# Patient Record
Sex: FEMALE | Race: AMERICAN INDIAN OR ALASKA NATIVE
[De-identification: names, ages, dates, MRNs, and addresses within clinical notes are randomized per-mention and may not be internally consistent; named-entity substitution may affect disease eponyms.]

---

## 2017-05-15 ENCOUNTER — HOSPITAL ENCOUNTER (EMERGENCY)
Dept: HOSPITAL 5 - ED | Age: 72
Discharge: HOME | End: 2017-05-15
Payer: MEDICARE

## 2017-05-15 VITALS — SYSTOLIC BLOOD PRESSURE: 125 MMHG | DIASTOLIC BLOOD PRESSURE: 78 MMHG

## 2017-05-15 DIAGNOSIS — S29.012A: Primary | ICD-10-CM
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
## 2017-05-15 DIAGNOSIS — F17.200: ICD-10-CM
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
## 2017-05-15 DIAGNOSIS — Z88.0: ICD-10-CM
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
## 2017-05-15 DIAGNOSIS — I10: ICD-10-CM

## 2017-05-15 DIAGNOSIS — J44.9: ICD-10-CM
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
## 2017-05-15 PROCEDURE — 72070 X-RAY EXAM THORAC SPINE 2VWS: CPT
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
## 2019-06-19 ENCOUNTER — HOSPITAL ENCOUNTER (EMERGENCY)
Dept: HOSPITAL 5 - ED | Age: 74
Discharge: HOME | End: 2019-06-19
Payer: MEDICARE

## 2019-06-19 DIAGNOSIS — M79.89: ICD-10-CM

## 2019-06-19 DIAGNOSIS — J44.9: ICD-10-CM

## 2019-06-19 DIAGNOSIS — I10: ICD-10-CM

## 2019-06-19 DIAGNOSIS — Z90.49: ICD-10-CM

## 2019-06-19 DIAGNOSIS — Z79.1: ICD-10-CM

## 2019-06-19 DIAGNOSIS — Z88.5: ICD-10-CM

## 2019-06-19 DIAGNOSIS — Z88.0: ICD-10-CM

## 2019-06-19 DIAGNOSIS — M10.071: Primary | ICD-10-CM

## 2019-06-19 DIAGNOSIS — Z98.890: ICD-10-CM

## 2019-06-19 PROCEDURE — 99283 EMERGENCY DEPT VISIT LOW MDM: CPT

## 2019-06-19 NOTE — EMERGENCY DEPARTMENT REPORT
ED General Adult HPI





- General


Chief complaint: Extremity Injury, Lower


Stated complaint: LT FOOT PAIN


Time Seen by Provider: 06/19/19 18:52


Source: patient


Mode of arrival: Wheelchair


Limitations: No Limitations





- History of Present Illness


Initial comments: 





73-year-old -American female with past medical history of osteoporosis 

and arthritis presents from urgent department complaining of atraumatic right 

foot pain.  Pain is to the mid foot was kind of radiates up to the right ankle, 

worse with ambulation and palpation.  She cannot recall an actual injury.  

Denies any fever, chills, bleeding, pustular discharge lacerations.  She reports

she may have had a gout flareup several years ago.  No nausea or vomiting, no 

chest pain, palpitations, no fever, chills, sweats. 


Location: lower extremity


Radiation: non-radiation, extremity


Quality: aching, dull


Improves with: none


Worsens with: none


Associated Symptoms: denies: confusion, chest pain, diaphoresis, fever/chills, 

loss of appetite, malaise, nausea/vomiting, rash, shortness of breath





- Related Data


                                  Previous Rx's











 Medication  Instructions  Recorded  Last Taken  Type


 


Cyclobenzaprine [Flexeril] 10 mg PO TID PRN #20 tablet 05/15/17 Unknown Rx


 


Ibuprofen [Motrin] 600 mg PO Q8H PRN #20 tablet 05/15/17 Unknown Rx


 


Indomethacin [Indocin] 25 mg PO Q8H #20 capsule 06/19/19 Unknown Rx











                                    Allergies











Allergy/AdvReac Type Severity Reaction Status Date / Time


 


morphine AdvReac  Dizziness Verified 06/19/19 18:33


 


Penicillins AdvReac  Swelling Verified 06/19/19 18:33














ED Review of Systems


ROS: 


Stated complaint: LT FOOT PAIN


Other details as noted in HPI





Constitutional: denies: chills, fever


Eyes: denies: eye pain, eye discharge, vision change


ENT: denies: ear pain, throat pain


Respiratory: denies: cough, shortness of breath, wheezing


Cardiovascular: denies: chest pain, palpitations


Endocrine: no symptoms reported


Gastrointestinal: denies: abdominal pain, nausea, diarrhea


Genitourinary: denies: urgency, dysuria, discharge


Musculoskeletal: joint swelling, arthralgia.  denies: back pain


Skin: denies: rash, lesions


Neurological: denies: headache, weakness, paresthesias


Psychiatric: denies: anxiety, depression


Hematological/Lymphatic: denies: easy bleeding, easy bruising





ED Past Medical Hx





- Past Medical History


Hx Hypertension: Yes


Hx COPD: Yes





- Surgical History


Hx Appendectomy: Yes


Additional Surgical History: hemorrhoidectomy, tonsil





- Social History


Smoking Status: Never Smoker


Substance Use Type: None





- Medications


Home Medications: 


                                Home Medications











 Medication  Instructions  Recorded  Confirmed  Last Taken  Type


 


Cyclobenzaprine [Flexeril] 10 mg PO TID PRN #20 tablet 05/15/17  Unknown Rx


 


Ibuprofen [Motrin] 600 mg PO Q8H PRN #20 tablet 05/15/17  Unknown Rx


 


Indomethacin [Indocin] 25 mg PO Q8H #20 capsule 06/19/19  Unknown Rx














ED Physical Exam





- General


Limitations: No Limitations


General appearance: alert, in no apparent distress





- Head


Head exam: Present: atraumatic, normocephalic





- Eye


Eye exam: Present: normal appearance, PERRL, EOMI


Pupils: Present: normal accommodation





- ENT


ENT exam: Present: normal exam, mucous membranes moist, TM's normal bilaterally





- Neck


Neck exam: Present: normal inspection, full ROM





- Respiratory


Respiratory exam: Present: normal lung sounds bilaterally.  Absent: respiratory 

distress, wheezes, rales, chest wall tenderness, accessory muscle use





- Cardiovascular


Cardiovascular Exam: Present: regular rate, normal rhythm.  Absent: systolic 

murmur, diastolic murmur, rubs, gallop





- GI/Abdominal


GI/Abdominal exam: Present: soft, normal bowel sounds.  Absent: tenderness, 

guarding, rebound





- Extremities Exam


Extremities exam: Present: normal inspection, full ROM, tenderness, normal 

capillary refill





- Expanded Lower Extremity Exam


  ** Right


Foot/Toe exam: Present: tenderness (there is tenderness along the area of the 

fourth metatarsals and up to the medial to lateral tarsals.), swelling.  Absent:

 laceration, ecchymosis, deformity, puncture wound, calcaneal tenderness, 

tenderness at base of 5th metatarsal, nail avulsion


Neuro vascular tendon exam: Present: no vascular compromise





- Back Exam


Back exam: Present: normal inspection, full ROM.  Absent: CVA tenderness (R), 

CVA tenderness (L), muscle spasm, paraspinal tenderness, vertebral tenderness





- Neurological Exam


Neurological exam: Present: alert, oriented X3, CN II-XII intact, normal gait





- Psychiatric


Psychiatric exam: Present: normal affect, normal mood.  Absent: anxious, flat 

affect, manic





- Skin


Skin exam: Present: warm, dry, intact, normal color.  Absent: rash, cyanosis, 

diaphoretic, erythema, petechiae, pallor, abrasion, ecchymosis





ED Course





                                   Vital Signs











  06/19/19





  18:53


 


Temperature 99.3 F


 


Pulse Rate 43 L


 


Respiratory 18





Rate 


 


Blood Pressure 122/58


 


O2 Sat by Pulse 97





Oximetry 














ED Medical Decision Making





- Radiology Data


Radiology results: report reviewed (x-ray of foot shows periosteal reaction to 

the fourth metatarsal shafts possibly a stress response)





- Medical Decision Making





73-year-old -American female process Mr. department spontaneous foot pain

 reports having had a history of a gout flareup of the pain and swelling is 

related to the ankle as well.  X-ray shows periosteal reaction.  No signs of an 

infectious process or CVA fracture.


May be secondary to prolonged arthritis/gout.  Arthritis/80 a healing/resolving 

stress fracture.  


Critical care attestation.: 


If time is entered above; I have spent that time in minutes in the direct care 

of this critically ill patient, excluding procedure time.








ED Disposition


Clinical Impression: 


 Foot swelling, Foot pain, right





Disposition: DC-01 TO HOME OR SELFCARE


Is pt being admited?: No


Does the pt Need Aspirin: No


Condition: Stable


Instructions:  Arthralgia (ED), Osteoarthritis (ED), Acute Gouty Arthritis (ED)


Referrals: 


PRIMARY CARE,MD [Primary Care Provider] - 3-5 Days


FELICITY CALLOWAY DPM [Staff Physician] - 3-5 Days


MICAH DYE MD [Staff Physician] - 3-5 Days

## 2019-06-19 NOTE — XRAY REPORT
PROCEDURE: XR ANKLE 3+V RT, XR FOOT 3+V RT 

 

TECHNIQUE:  Right ankle radiographs, AP, lateral, and oblique views., Right foot 3 

 

HISTORY: pain , no known injury 

 

COMPARISONS:  None . 

 

FINDINGS: Region of pain is not demarcated. 

 

Fracture (s) and/or Dislocation(s):  None . 

Alignment:  Normal . 

Joint space(s):  Normal . 

Soft tissues:  Soft tissue swelling inferior to the lateral malleolus. . 

Bone mineralization:  Diffusely osteopenic. Periosteal reaction fourth metatarsal shaft. 

Foreign bodies:  None . 

Calcaneal spurring:  None . 

 

Arterial calcification. 

 

IMPRESSION: No acute fracture or dislocation identified. 

 

Global osteopenia. 

 

Periosteal reaction of the fourth metatarsal shaft may be a stress response. 

 

This document is electronically signed by Priscilla Philip MD., June 19 2019 08:09:08 PM ET

## 2019-06-19 NOTE — EMERGENCY DEPARTMENT REPORT
Blank Doc





- Documentation


Documentation: 





This is a 73-year-old female that presents with right ankle and foot pain.  Un

sure if she injured it.





This initial assessment/diagnostic orders/clinical plan/treatment(s) is/are 

subject to change based on patient's health status, clinical progression and re-

assessment by fellow clinical providers in the ED.  Further treatment and workup

at subsequent clinical providers discretion.  Patient/guardians urged not to 

elope from the ED as their condition may be serious if not clinically assessed 

and managed.  Initial orders include:


1- Patient sent to [facility] for further evaluation and treatment


2- xrays

## 2019-06-20 VITALS — SYSTOLIC BLOOD PRESSURE: 107 MMHG | DIASTOLIC BLOOD PRESSURE: 49 MMHG
